# Patient Record
Sex: MALE | Race: WHITE
[De-identification: names, ages, dates, MRNs, and addresses within clinical notes are randomized per-mention and may not be internally consistent; named-entity substitution may affect disease eponyms.]

---

## 2018-12-28 ENCOUNTER — HOSPITAL ENCOUNTER (OUTPATIENT)
Dept: HOSPITAL 84 - D.ER | Age: 38
Discharge: HOME | End: 2018-12-28
Attending: ORTHOPAEDIC SURGERY
Payer: SELF-PAY

## 2018-12-28 VITALS — DIASTOLIC BLOOD PRESSURE: 85 MMHG | SYSTOLIC BLOOD PRESSURE: 135 MMHG

## 2018-12-28 VITALS
WEIGHT: 158.33 LBS | HEIGHT: 64 IN | BODY MASS INDEX: 27.03 KG/M2 | WEIGHT: 158.33 LBS | HEIGHT: 64 IN | WEIGHT: 158.33 LBS | BODY MASS INDEX: 27.03 KG/M2 | HEIGHT: 64 IN | BODY MASS INDEX: 27.03 KG/M2

## 2018-12-28 DIAGNOSIS — Z01.812: ICD-10-CM

## 2018-12-28 DIAGNOSIS — S61.102A: ICD-10-CM

## 2018-12-28 DIAGNOSIS — S62.522B: Primary | ICD-10-CM
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
## 2018-12-28 LAB
ALBUMIN SERPL-MCNC: 4.2 G/DL (ref 3.4–5)
ALP SERPL-CCNC: 103 U/L (ref 46–116)
ALT SERPL-CCNC: 31 U/L (ref 10–68)
ANION GAP SERPL CALC-SCNC: 17.1 MMOL/L (ref 8–16)
BASOPHILS NFR BLD AUTO: 0.2 % (ref 0–2)
BILIRUB SERPL-MCNC: 0.31 MG/DL (ref 0.2–1.3)
BUN SERPL-MCNC: 18 MG/DL (ref 7–18)
CALCIUM SERPL-MCNC: 8.6 MG/DL (ref 8.5–10.1)
CHLORIDE SERPL-SCNC: 101 MMOL/L (ref 98–107)
CO2 SERPL-SCNC: 22.6 MMOL/L (ref 21–32)
CREAT SERPL-MCNC: 1.2 MG/DL (ref 0.6–1.3)
EOSINOPHIL NFR BLD: 1 % (ref 0–7)
ERYTHROCYTE [DISTWIDTH] IN BLOOD BY AUTOMATED COUNT: 13.1 % (ref 11.5–14.5)
GLOBULIN SER-MCNC: 3.9 G/L
GLUCOSE SERPL-MCNC: 79 MG/DL (ref 74–106)
HCT VFR BLD CALC: 46.2 % (ref 42–54)
HGB BLD-MCNC: 16.2 G/DL (ref 13.5–17.5)
IMM GRANULOCYTES NFR BLD: 0.2 % (ref 0–5)
LYMPHOCYTES NFR BLD AUTO: 33.2 % (ref 15–50)
MCH RBC QN AUTO: 32.1 PG (ref 26–34)
MCHC RBC AUTO-ENTMCNC: 35.1 G/DL (ref 31–37)
MCV RBC: 91.5 FL (ref 80–100)
MONOCYTES NFR BLD: 7.9 % (ref 2–11)
NEUTROPHILS NFR BLD AUTO: 57.5 % (ref 40–80)
OSMOLALITY SERPL CALC.SUM OF ELEC: 274 MOSM/KG (ref 275–300)
PLATELET # BLD: 300 10X3/UL (ref 130–400)
PMV BLD AUTO: 9.7 FL (ref 7.4–10.4)
POTASSIUM SERPL-SCNC: 3.7 MMOL/L (ref 3.5–5.1)
PROT SERPL-MCNC: 8.1 G/DL (ref 6.4–8.2)
RBC # BLD AUTO: 5.05 10X6/UL (ref 4.2–6.1)
SODIUM SERPL-SCNC: 137 MMOL/L (ref 136–145)
WBC # BLD AUTO: 12.5 10X3/UL (ref 4.8–10.8)

## 2018-12-28 NOTE — NUR
KWAN BARNETTDELANEY BROUGHT TO PT. NO FAMILY HERE. RELATES HIS EX GIRLFRIEND HAD
TAKEN HIS TRUCK AND PHONE AND HE COULD NOT REMEMBER ANYONES NUMBER.
CALLED HOUSE SUPERVISOR TO GET A VOUCHER FOR A CAB FARE.

## 2018-12-28 NOTE — NUR
TOLERATED DIET. IV DC'D WITH CATHETER INTACT. EXPLAINED TO PT WE WERE
PROVIDING CAB FARE FOR TRANSPORTATION HOME. VERBALIZED UNDERSTANDING.

## 2018-12-28 NOTE — NUR
WRITTEN AND VERBAL DC INST. GIVEN TO PT ALONG WITH RX. VERBALIZED
UNDERSTANDING. PATIENT'S EX GIRLFRIEND CAME INTO THE ROOM AS WE WERE
ABOUT TO CALL FOR A CAB.  SHE WAS HERE TO TAKE HIM HOME.

## 2018-12-30 NOTE — OP
PATIENT NAME:  FARAZ LUEVANO                         MEDICAL RECORD: [id number]
:84                                             LOCATION:D.OPS          
                                                         ADMISSION DATE:        
SURGEON:  LISETTE ROTH MD        
 
 
DATE OF OPERATION:  2018
 
PREOPERATIVE DIAGNOSIS:  Open tuft fracture of the left thumb with nailbed
avulsion.
 
POSTOPERATIVE DIAGNOSIS:  Open tuft fracture of the left thumb with nailbed
avulsion.
 
PROCEDURES:
1.  Nail bed reconstruction.
2.  Debridement of open wound, skin; subcutaneous tissue; portions of fat,
fascia, and muscle; less than 20 cm in aggregate.
 
SURGEON:  Lisette Roth MD
 
ANESTHESIA:  General, TIVA.
 
INTRAOPERATIVE COMPLICATIONS:  None.
 
SUMMARY OF PATHOLOGIC FINDINGS:  The patient had a near complete amputation and
avulsion; however, he had good volar vascularization.  Therefore, this was
irrigated and closed.  The distal tuft of the thumb was broken off.  I did not
feel like it warranted pin fixation as it was very small.
 
OPERATIVE SUMMARY IN DETAIL:  After obtaining the appropriate preoperative
orthopedic surgery consent as well as anesthetic consultation, evaluation, and
clearance, the patient was brought to the operating room and placed on the
operating table in the supine position.  After general laryngeal mask airway was
administered, the patient's left upper extremity, having adequately been
blocked, was prepped and draped in routine sterile fashion.  Copious pulsatile
lavage was also coupled with rongeur and irrigation of debris and small bone
fragments as well as the above mentioned.  At this point, the thumb was held in
a reduced position with the nail bed back into the germinal fold and transnail
bed sutures were taken up into the more proximal aspect of the dorsal aspect of
the skin in multiple rows to reapproximate this.  The side lacerations on radial
and ulnar side of the thumb too were likewise closed with simple sutures. 
Having completed this, sterile dressings were applied.  The patient was awakened
and taken to the recovery room in stable condition.  All final needle and sponge
counts were correct.
 
TRANSINT:[id number] Voice Confirmation ID: [id number] DOCUMENT ID: [id number]
                                           
                                           LISETTE ROTH MD        
 
 
 
Electronically Signed by LISETTE ROTH MD on 18 at 1037
CC:                                                             [id number]
DICTATION DATE: 18 1354     :     18 2201      [facility] 
                                                                      [address]
[place] [address]

## 2019-01-16 ENCOUNTER — HOSPITAL ENCOUNTER (INPATIENT)
Dept: HOSPITAL 84 - D.ER | Age: 39
LOS: 3 days | Discharge: HOME | DRG: 921 | End: 2019-01-19
Attending: ORTHOPAEDIC SURGERY | Admitting: ORTHOPAEDIC SURGERY
Payer: MEDICAID

## 2019-01-16 VITALS
BODY MASS INDEX: 26.8 KG/M2 | WEIGHT: 157 LBS | HEIGHT: 64 IN | BODY MASS INDEX: 26.8 KG/M2 | HEIGHT: 64 IN | WEIGHT: 157 LBS | BODY MASS INDEX: 26.8 KG/M2

## 2019-01-16 DIAGNOSIS — L03.012: ICD-10-CM
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
## 2019-01-16 DIAGNOSIS — S62.522D: ICD-10-CM

## 2019-01-16 DIAGNOSIS — Z72.0: ICD-10-CM
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
## 2019-01-16 DIAGNOSIS — L76.82: Primary | ICD-10-CM

## 2019-01-16 LAB
BASOPHILS NFR BLD AUTO: 0.2 % (ref 0–2)
EOSINOPHIL NFR BLD: 1.4 % (ref 0–7)
ERYTHROCYTE [DISTWIDTH] IN BLOOD BY AUTOMATED COUNT: 13.4 % (ref 11.5–14.5)
HCT VFR BLD CALC: 42.9 % (ref 42–54)
HGB BLD-MCNC: 14.7 G/DL (ref 13.5–17.5)
IMM GRANULOCYTES NFR BLD: 0.2 % (ref 0–5)
LYMPHOCYTES NFR BLD AUTO: 18.6 % (ref 15–50)
MCH RBC QN AUTO: 31.5 PG (ref 26–34)
MCHC RBC AUTO-ENTMCNC: 34.3 G/DL (ref 31–37)
MCV RBC: 92.1 FL (ref 80–100)
MONOCYTES NFR BLD: 9.6 % (ref 2–11)
NEUTROPHILS NFR BLD AUTO: 70 % (ref 40–80)
PLATELET # BLD: 271 10X3/UL (ref 130–400)
PMV BLD AUTO: 8.9 FL (ref 7.4–10.4)
RBC # BLD AUTO: 4.66 10X6/UL (ref 4.2–6.1)
WBC # BLD AUTO: 12.5 10X3/UL (ref 4.8–10.8)

## 2019-01-16 NOTE — NUR
RECEIVED PATIENT FROM ER, ADMITTED WITH CELLULITIS IN L THUMB, GOING FOR SX IN
AM. INTRODUCED SELF TO PATIENT, BED IN LOWEST POSITION, FRIEND AT BEDSIDE WITH
FOOD. EXPLAINED PATIENT IS NPO AFTER MIDNIGHT, PT UNDERSTOOD. CALL LIGHT
WITHIN REACH. NO S/SX OF DISTRESS OF PAIN AT THIS TIME.

## 2019-01-17 VITALS — DIASTOLIC BLOOD PRESSURE: 66 MMHG | SYSTOLIC BLOOD PRESSURE: 115 MMHG

## 2019-01-17 VITALS — SYSTOLIC BLOOD PRESSURE: 110 MMHG | DIASTOLIC BLOOD PRESSURE: 68 MMHG

## 2019-01-17 VITALS — DIASTOLIC BLOOD PRESSURE: 96 MMHG | SYSTOLIC BLOOD PRESSURE: 140 MMHG

## 2019-01-17 VITALS — SYSTOLIC BLOOD PRESSURE: 128 MMHG | DIASTOLIC BLOOD PRESSURE: 70 MMHG

## 2019-01-17 VITALS — SYSTOLIC BLOOD PRESSURE: 109 MMHG | DIASTOLIC BLOOD PRESSURE: 50 MMHG

## 2019-01-17 VITALS — SYSTOLIC BLOOD PRESSURE: 119 MMHG | DIASTOLIC BLOOD PRESSURE: 72 MMHG

## 2019-01-17 NOTE — NUR
INTRODUCED SELF TO PATIENT, PATIENT WAS IN BED EATING A FRUIT TRAY.
PATIENT UPDATED ME THAT HE DIDN'T HAVE SURGERY AND WOULD CONTINUE ON
ANTIBIOTIC TREATMENT. BED IN LOWEST POSITION, CALL LIGHT WITHIN REACH. NO
NEEDS AT THIS TIME.

## 2019-01-17 NOTE — NUR
PATIENT SLEEPING, RESP EVEN AND UNLABORED. BED IN LOWEST POSITION, CALL LIGHT
WITHIN REACH. NO SIGNS/SYMPTOMS OF DISTRESS OR PAIN AT THIS TIME. WILL
CONTINUE TO MONITOR.

## 2019-01-17 NOTE — NUR
HIPACLEANSE TO LEFT THUMB, PATIENT REMAINED NPO THROUGHOUT THE NIGHT. GAVE
BOLUS 0.4MG FOR PAIN CONTROL. RESP LUIS FELIPE AND UNLABORED. PATIENT PROVIDED GOWN
AND INSTRUCTED TO CHANGE INTO IT. BED IN LOWEST POSITION, CALL LIGHT IN REACH.
NO NEEDS AT THIS TIME, WILL CONTINUE TO MONITOR.

## 2019-01-17 NOTE — NUR
PT INQUIRED ON WHEN POSSIBLE SURGERY MAY BE, ADVISED PT I HAVE NOT SEEN ANY
ORDERS YET BUT PT IS NPO AND WE WILL CONTINUE WITH PLAN OF CARE

## 2019-01-18 VITALS — DIASTOLIC BLOOD PRESSURE: 80 MMHG | SYSTOLIC BLOOD PRESSURE: 132 MMHG

## 2019-01-18 VITALS — SYSTOLIC BLOOD PRESSURE: 113 MMHG | DIASTOLIC BLOOD PRESSURE: 70 MMHG

## 2019-01-18 VITALS — SYSTOLIC BLOOD PRESSURE: 118 MMHG | DIASTOLIC BLOOD PRESSURE: 74 MMHG

## 2019-01-18 VITALS — SYSTOLIC BLOOD PRESSURE: 128 MMHG | DIASTOLIC BLOOD PRESSURE: 69 MMHG

## 2019-01-18 VITALS — DIASTOLIC BLOOD PRESSURE: 76 MMHG | SYSTOLIC BLOOD PRESSURE: 116 MMHG

## 2019-01-18 LAB
ANION GAP SERPL CALC-SCNC: 10.2 MMOL/L (ref 8–16)
BUN SERPL-MCNC: 12 MG/DL (ref 7–18)
CALCIUM SERPL-MCNC: 7.8 MG/DL (ref 8.5–10.1)
CHLORIDE SERPL-SCNC: 102 MMOL/L (ref 98–107)
CO2 SERPL-SCNC: 28.6 MMOL/L (ref 21–32)
CREAT SERPL-MCNC: 1 MG/DL (ref 0.6–1.3)
ERYTHROCYTE [DISTWIDTH] IN BLOOD BY AUTOMATED COUNT: 13.6 % (ref 11.5–14.5)
GLUCOSE SERPL-MCNC: 144 MG/DL (ref 74–106)
HCT VFR BLD CALC: 43.5 % (ref 42–54)
HGB BLD-MCNC: 14.2 G/DL (ref 13.5–17.5)
MCH RBC QN AUTO: 30.8 PG (ref 26–34)
MCHC RBC AUTO-ENTMCNC: 32.6 G/DL (ref 31–37)
MCV RBC: 94.4 FL (ref 80–100)
OSMOLALITY SERPL CALC.SUM OF ELEC: 276 MOSM/KG (ref 275–300)
PLATELET # BLD: 262 10X3/UL (ref 130–400)
PMV BLD AUTO: 9.2 FL (ref 7.4–10.4)
POTASSIUM SERPL-SCNC: 3.8 MMOL/L (ref 3.5–5.1)
RBC # BLD AUTO: 4.61 10X6/UL (ref 4.2–6.1)
SODIUM SERPL-SCNC: 137 MMOL/L (ref 136–145)
WBC # BLD AUTO: 9.7 10X3/UL (ref 4.8–10.8)

## 2019-01-18 NOTE — NUR
GAVE PATIENT DILAUDID BOLUS OF 0.4MG, PATIENT GRIMACING AND RATED PAIN 4/10.
BED IN LOWEST POSITION, CALL LIGHT WITHIN REACH. NO NEEDS AT THIS TIME.

## 2019-01-18 NOTE — NUR
PT AAOX4 SITTING UP IN BED EATING LUNCH, NO SIGNS OF DISTRESS NOTED, CL IN
REACH BED IN LOWEST POSTION SR UPX2

## 2019-01-19 VITALS — SYSTOLIC BLOOD PRESSURE: 118 MMHG | DIASTOLIC BLOOD PRESSURE: 73 MMHG

## 2019-01-19 VITALS — DIASTOLIC BLOOD PRESSURE: 81 MMHG | SYSTOLIC BLOOD PRESSURE: 123 MMHG

## 2019-01-19 NOTE — NUR
DISCUSSED DISCHARGE, MEDICATION AND FOLLOW-UP INSTRUCTIONS WITH PATIENT.
VERBALIZED UNDERSTANDING. IV REMOVED, TIP INTACT. ALL BELONGINGS SENT WITH
PATIENT. PATIENT DISCHARGED HOME VIA WHEELCHAIR BY PAULA SWAN.

## 2020-02-27 ENCOUNTER — OFFICE VISIT (OUTPATIENT)
Dept: FAMILY MEDICINE CLINIC | Age: 40
End: 2020-02-27
Payer: MEDICARE

## 2020-02-27 VITALS
SYSTOLIC BLOOD PRESSURE: 120 MMHG | DIASTOLIC BLOOD PRESSURE: 82 MMHG | BODY MASS INDEX: 20.02 KG/M2 | WEIGHT: 156 LBS | OXYGEN SATURATION: 100 % | HEART RATE: 78 BPM | RESPIRATION RATE: 16 BRPM | TEMPERATURE: 97.5 F | HEIGHT: 74 IN

## 2020-02-27 DIAGNOSIS — Z11.4 ENCOUNTER FOR SCREENING FOR HIV: ICD-10-CM

## 2020-02-27 DIAGNOSIS — R20.0 NUMBNESS AND TINGLING IN BOTH HANDS: ICD-10-CM

## 2020-02-27 DIAGNOSIS — E55.9 VITAMIN D DEFICIENCY: ICD-10-CM

## 2020-02-27 DIAGNOSIS — R20.2 NUMBNESS AND TINGLING IN BOTH HANDS: ICD-10-CM

## 2020-02-27 DIAGNOSIS — Z13.220 SCREENING CHOLESTEROL LEVEL: ICD-10-CM

## 2020-02-27 DIAGNOSIS — R19.5 LOOSE STOOLS: ICD-10-CM

## 2020-02-27 PROBLEM — Z00.00 ROUTINE HISTORY AND PHYSICAL EXAMINATION OF ADULT: Status: ACTIVE | Noted: 2020-02-27

## 2020-02-27 LAB
ALBUMIN SERPL-MCNC: 4.4 G/DL (ref 3.5–4.6)
ALP BLD-CCNC: 62 U/L (ref 35–104)
ALT SERPL-CCNC: 23 U/L (ref 0–41)
ANION GAP SERPL CALCULATED.3IONS-SCNC: 18 MEQ/L (ref 9–15)
AST SERPL-CCNC: 35 U/L (ref 0–40)
BILIRUB SERPL-MCNC: 0.4 MG/DL (ref 0.2–0.7)
BUN BLDV-MCNC: 9 MG/DL (ref 6–20)
CALCIUM SERPL-MCNC: 9.8 MG/DL (ref 8.5–9.9)
CHLORIDE BLD-SCNC: 103 MEQ/L (ref 95–107)
CHOLESTEROL, TOTAL: 184 MG/DL (ref 0–199)
CO2: 24 MEQ/L (ref 20–31)
CREAT SERPL-MCNC: 1 MG/DL (ref 0.7–1.2)
FOLATE: 13 NG/ML (ref 7.3–26.1)
GFR AFRICAN AMERICAN: >60
GFR NON-AFRICAN AMERICAN: >60
GLOBULIN: 3.2 G/DL (ref 2.3–3.5)
GLUCOSE BLD-MCNC: 79 MG/DL (ref 70–99)
HCT VFR BLD CALC: 44.5 % (ref 42–52)
HDLC SERPL-MCNC: 57 MG/DL (ref 40–59)
HEMOGLOBIN: 14.8 G/DL (ref 14–18)
LDL CHOLESTEROL CALCULATED: 98 MG/DL (ref 0–129)
MCH RBC QN AUTO: 30.2 PG (ref 27–31.3)
MCHC RBC AUTO-ENTMCNC: 33.4 % (ref 33–37)
MCV RBC AUTO: 90.5 FL (ref 80–100)
PDW BLD-RTO: 13.6 % (ref 11.5–14.5)
PLATELET # BLD: 234 K/UL (ref 130–400)
POTASSIUM SERPL-SCNC: 4.8 MEQ/L (ref 3.4–4.9)
PROCALCITONIN: 0.03 NG/ML (ref 0–0.15)
RBC # BLD: 4.92 M/UL (ref 4.7–6.1)
SODIUM BLD-SCNC: 145 MEQ/L (ref 135–144)
TOTAL PROTEIN: 7.6 G/DL (ref 6.3–8)
TRIGL SERPL-MCNC: 145 MG/DL (ref 0–150)
TSH REFLEX: 1.75 UIU/ML (ref 0.44–3.86)
VITAMIN B-12: 1186 PG/ML (ref 232–1245)
VITAMIN D 25-HYDROXY: 11.7 NG/ML (ref 30–100)
WBC # BLD: 6.5 K/UL (ref 4.8–10.8)

## 2020-02-27 PROCEDURE — 99385 PREV VISIT NEW AGE 18-39: CPT | Performed by: PHYSICIAN ASSISTANT

## 2020-02-27 RX ORDER — PREDNISONE 10 MG/1
TABLET ORAL
Qty: 51 TABLET | Refills: 0 | Status: SHIPPED | OUTPATIENT
Start: 2020-02-27 | End: 2020-03-08

## 2020-02-27 RX ORDER — DICYCLOMINE HYDROCHLORIDE 10 MG/1
10 CAPSULE ORAL 4 TIMES DAILY
Qty: 360 CAPSULE | Refills: 1 | Status: SHIPPED | OUTPATIENT
Start: 2020-02-27 | End: 2020-07-17 | Stop reason: SDUPTHER

## 2020-02-27 RX ORDER — METHOCARBAMOL 500 MG/1
500 TABLET, FILM COATED ORAL NIGHTLY PRN
Qty: 10 TABLET | Refills: 0 | Status: SHIPPED | OUTPATIENT
Start: 2020-02-27 | End: 2020-04-09

## 2020-02-27 ASSESSMENT — PATIENT HEALTH QUESTIONNAIRE - PHQ9
SUM OF ALL RESPONSES TO PHQ QUESTIONS 1-9: 0
1. LITTLE INTEREST OR PLEASURE IN DOING THINGS: 0
SUM OF ALL RESPONSES TO PHQ9 QUESTIONS 1 & 2: 0
SUM OF ALL RESPONSES TO PHQ QUESTIONS 1-9: 0
2. FEELING DOWN, DEPRESSED OR HOPELESS: 0

## 2020-02-27 ASSESSMENT — ENCOUNTER SYMPTOMS
RECTAL PAIN: 0
ABDOMINAL DISTENTION: 1
BACK PAIN: 0
ANAL BLEEDING: 0
BLOOD IN STOOL: 0
FACIAL SWELLING: 0
WHEEZING: 0
CONSTIPATION: 0
COLOR CHANGE: 0
TROUBLE SWALLOWING: 0
DIARRHEA: 1
SHORTNESS OF BREATH: 0
COUGH: 0
VOMITING: 0
CHEST TIGHTNESS: 0
NAUSEA: 0

## 2020-02-27 NOTE — PROGRESS NOTES
Subjective  Ma Portal, 47 y.o. male presents today with:  Chief Complaint   Patient presents with   Delia Bio Establish Care     pain in both arms and back of the neck      HPI  Tiny Kemps is in the office today to establish care. Previous PCP: Dr. Vicente Perez; last OV: 2015    Establish care/routine physical.  Would like to establish care with provider. Has not followed up with a PCP in 5 years. Due for routine labs. Denies any chronic medical conditions. Past history of GERD/reflux. Numbness/tingling hands. Increased bilateral numbness/tingling of hands. Decreased  strength, dropping things. R hand dominant. Sensation of hands cramping and \"zinging\" sensation that travels up arms. Feels like his arms/shoulders will \"spasm\". Has tried a friend's \"arthritis pill\" with little relief. Denies trauma/injury. Loose stools. Chronic condition. Reports that he needs to use the restroom/have a BM every time after he eats. +abdominal cramping/bloating. Relief with BM. Stools are loose/soft. Denies hematochezia, melena, rectal pain. Feels like he has a hard time maintaining his body weight. Denies abnormal weight loss. No fever/night sweats. No known family history of IBD. Review of Systems   Constitutional: Positive for unexpected weight change. Negative for activity change, appetite change, chills, diaphoresis, fatigue and fever. HENT: Negative for congestion, dental problem, ear discharge, ear pain, facial swelling, postnasal drip and trouble swallowing. Eyes: Negative for visual disturbance. Respiratory: Negative for cough, chest tightness, shortness of breath and wheezing. Cardiovascular: Negative for chest pain, palpitations and leg swelling. Gastrointestinal: Positive for abdominal distention (mild cramping/bloating) and diarrhea (loose stools). Negative for anal bleeding, blood in stool, constipation, nausea, rectal pain and vomiting.    Genitourinary: Negative for difficulty urinating. Musculoskeletal: Positive for arthralgias and myalgias. Negative for back pain, gait problem, joint swelling, neck pain and neck stiffness. Skin: Negative for color change and rash. Neurological: Positive for numbness. Negative for dizziness and weakness. Hematological: Does not bruise/bleed easily. Psychiatric/Behavioral: Negative for agitation, dysphoric mood and sleep disturbance. The patient is not nervous/anxious. History reviewed. No pertinent past medical history. Past Surgical History:   Procedure Laterality Date    COLONOSCOPY  3/27/15    w/bx      Social History     Socioeconomic History    Marital status: Single     Spouse name: Not on file    Number of children: Not on file    Years of education: Not on file    Highest education level: Not on file   Occupational History    Not on file   Social Needs    Financial resource strain: Not on file    Food insecurity:     Worry: Not on file     Inability: Not on file    Transportation needs:     Medical: Not on file     Non-medical: Not on file   Tobacco Use    Smoking status: Former Smoker     Packs/day: 0.25     Years: 4.00     Pack years: 1.00     Last attempt to quit: 2019     Years since quittin.0    Smokeless tobacco: Never Used   Substance and Sexual Activity    Alcohol use:  Yes     Alcohol/week: 14.0 standard drinks     Types: 14 Cans of beer per week    Drug use: No    Sexual activity: Yes     Partners: Female   Lifestyle    Physical activity:     Days per week: Not on file     Minutes per session: Not on file    Stress: Not on file   Relationships    Social connections:     Talks on phone: Not on file     Gets together: Not on file     Attends Jehov[employer]'s witness service: Not on file     Active member of club or organization: Not on file     Attends meetings of clubs or organizations: Not on file     Relationship status: Not on file    Intimate partner violence:     Fear of current or ex partner: Not on file     Emotionally abused: Not on file     Physically abused: Not on file     Forced sexual activity: Not on file   Other Topics Concern    Not on file   Social History Narrative    Not on file     Family History   Problem Relation Age of Onset    High Blood Pressure Mother     High Blood Pressure Father      Allergies   Allergen Reactions    Vicodin [Hydrocodone-Acetaminophen] Other (See Comments)     Stomach cramps     Current Outpatient Medications   Medication Sig Dispense Refill    dicyclomine (BENTYL) 10 MG capsule Take 1 capsule by mouth 4 times daily 360 capsule 1    predniSONE (DELTASONE) 10 MG tablet Take 6 tabs x 6 days, then 5 x 1, 4 x 1, 3 x 1, 2 x 1, 1 x 1 51 tablet 0    methocarbamol (ROBAXIN) 500 MG tablet Take 1 tablet by mouth nightly as needed (upper back spasm) 10 tablet 0     No current facility-administered medications for this visit. PMH, Surgical Hx, Family Hx, and Social Hx reviewed and updated. Health Maintenance reviewed. Objective  Vitals:    02/27/20 1051   BP: 120/82   Site: Left Upper Arm   Position: Sitting   Cuff Size: Medium Adult   Pulse: 78   Resp: 16   Temp: 97.5 °F (36.4 °C)   TempSrc: Temporal   SpO2: 100%   Weight: 156 lb (70.8 kg)   Height: 6' 2\" (1.88 m)     BP Readings from Last 3 Encounters:   02/27/20 120/82   03/24/15 127/86   03/11/15 124/72     Wt Readings from Last 3 Encounters:   02/27/20 156 lb (70.8 kg)   03/24/15 162 lb (73.5 kg)   03/11/15 160 lb (72.6 kg)     Physical Exam  Vitals signs reviewed. Constitutional:       General: He is not in acute distress. Appearance: He is well-developed. He is not ill-appearing, toxic-appearing or diaphoretic. Comments: Thin male, sitting in exam room. NAD   HENT:      Head: Normocephalic and atraumatic. Eyes:      Conjunctiva/sclera: Conjunctivae normal.   Neck:      Musculoskeletal: Normal range of motion. Cardiovascular:      Rate and Rhythm: Normal rate.    Pulmonary: Effort: Pulmonary effort is normal.   Abdominal:      General: Abdomen is flat. Bowel sounds are increased. Palpations: Abdomen is soft. Tenderness: There is no abdominal tenderness. Musculoskeletal:      Cervical back: He exhibits tenderness and spasm (bilateral upper shoulders/trapezius). He exhibits normal range of motion and no bony tenderness. Comments:  strength 4/5 bilaterally. Skin:     General: Skin is warm and dry. Neurological:      Mental Status: He is alert and oriented to person, place, and time. Cranial Nerves: No cranial nerve deficit. Assessment & Plan   Yuki Machado was seen today for establish care. Diagnoses and all orders for this visit:    Routine history and physical examination of adult    Encounter for screening for HIV  -     Cancel: HIV-1,-2 w/Reflex to HIV-1 Western Blot; Future    BMI 20.0-20.9, adult  -     CBC; Future  -     Comprehensive Metabolic Panel; Future  -     TSH with Reflex; Future    Loose stools  -     dicyclomine (BENTYL) 10 MG capsule; Take 1 capsule by mouth 4 times daily  -     TSH with Reflex; Future  -     Procalcitonin; Future    Screening cholesterol level  -     Lipid Panel; Future    Numbness and tingling in both hands  -     Vitamin B12 & Folate; Future  -     EMG; Future    Vitamin D deficiency  -     Vitamin D 25 Hydroxy; Future    Muscle spasm of back  -     predniSONE (DELTASONE) 10 MG tablet; Take 6 tabs x 6 days, then 5 x 1, 4 x 1, 3 x 1, 2 x 1, 1 x 1  -     methocarbamol (ROBAXIN) 500 MG tablet; Take 1 tablet by mouth nightly as needed (upper back spasm)    4 week follow up with me.     Orders Placed This Encounter   Procedures    CBC     Standing Status:   Future     Number of Occurrences:   1     Standing Expiration Date:   2/26/2021    Comprehensive Metabolic Panel     Standing Status:   Future     Number of Occurrences:   1     Standing Expiration Date:   2/26/2021    Lipid Panel     Standing Status: for coordination of care. I have reviewed the patient's medical history in detail and updated the computerized patient record.     Lucinda Kemp PA-C

## 2020-02-28 LAB — HIV 1,2 COMBO ANTIGEN/ANTIBODY: NEGATIVE

## 2020-02-28 RX ORDER — ERGOCALCIFEROL 1.25 MG/1
CAPSULE ORAL
Qty: 17 CAPSULE | Refills: 1 | Status: SHIPPED | OUTPATIENT
Start: 2020-02-28 | End: 2020-05-28

## 2020-03-19 ENCOUNTER — TELEPHONE (OUTPATIENT)
Dept: FAMILY MEDICINE CLINIC | Age: 40
End: 2020-03-19

## 2020-03-19 RX ORDER — CELECOXIB 100 MG/1
100 CAPSULE ORAL 2 TIMES DAILY
Qty: 60 CAPSULE | Refills: 3 | Status: SHIPPED | OUTPATIENT
Start: 2020-03-19 | End: 2020-03-20

## 2020-03-19 NOTE — TELEPHONE ENCOUNTER
Sent medication. Take as needed. No more than twice daily. Do not use with motrin/aleve. This will be safe for his stomach.

## 2020-03-19 NOTE — TELEPHONE ENCOUNTER
Patient calling to let you know that his right arm is still bothering him, in pain, unable to sleep well. What can he do for the pain? Can you prescribe something for pain? Please advise and thanks!

## 2020-03-20 ENCOUNTER — TELEPHONE (OUTPATIENT)
Dept: FAMILY MEDICINE CLINIC | Age: 40
End: 2020-03-20

## 2020-03-20 RX ORDER — MELOXICAM 7.5 MG/1
7.5 TABLET ORAL DAILY
Qty: 30 TABLET | Refills: 3 | Status: SHIPPED | OUTPATIENT
Start: 2020-03-20 | End: 2020-09-03 | Stop reason: SDUPTHER

## 2020-03-20 NOTE — TELEPHONE ENCOUNTER
Patient called the celebrex is 200$ so he is requesting something else sent to the pharmacy for the pain

## 2020-03-28 PROBLEM — Z00.00 ROUTINE HISTORY AND PHYSICAL EXAMINATION OF ADULT: Status: RESOLVED | Noted: 2020-02-27 | Resolved: 2020-03-28

## 2020-05-28 RX ORDER — ERGOCALCIFEROL 1.25 MG/1
CAPSULE ORAL
Qty: 4 CAPSULE | Refills: 5 | Status: SHIPPED | OUTPATIENT
Start: 2020-05-28 | End: 2020-07-17 | Stop reason: SDUPTHER

## 2020-07-17 RX ORDER — DICYCLOMINE HYDROCHLORIDE 10 MG/1
10 CAPSULE ORAL 4 TIMES DAILY
Qty: 360 CAPSULE | Refills: 1 | Status: SHIPPED | OUTPATIENT
Start: 2020-07-17 | End: 2022-02-04 | Stop reason: DRUGHIGH

## 2020-07-17 RX ORDER — ERGOCALCIFEROL 1.25 MG/1
CAPSULE ORAL
Qty: 4 CAPSULE | Refills: 5 | Status: SHIPPED | OUTPATIENT
Start: 2020-07-17 | End: 2020-09-03 | Stop reason: SDUPTHER

## 2020-07-17 NOTE — TELEPHONE ENCOUNTER
Patient requesting medication refill. Please approve or deny this request.    Rx requested:  Requested Prescriptions     Pending Prescriptions Disp Refills    vitamin D (ERGOCALCIFEROL) 1.25 MG (20512 UT) CAPS capsule 4 capsule 5     Si capsule by mouth weekly.  dicyclomine (BENTYL) 10 MG capsule 360 capsule 1     Sig: Take 1 capsule by mouth 4 times daily         Last Office Visit:   2020      Next Visit Date:  No future appointments.

## 2021-10-03 NOTE — TELEPHONE ENCOUNTER
Rx request   Requested Prescriptions     Pending Prescriptions Disp Refills    meloxicam (MOBIC) 7.5 MG tablet [Pharmacy Med Name: MELOXICAM 7.5 MG TABLET] 30 tablet 3     Sig: TAKE 1 TABLET BY MOUTH EVERY DAY     LOV 2/27/2020  Next Visit Date:  No future appointments.

## 2021-10-04 RX ORDER — MELOXICAM 7.5 MG/1
TABLET ORAL
Qty: 30 TABLET | Refills: 3 | Status: SHIPPED | OUTPATIENT
Start: 2021-10-04 | End: 2022-03-08

## 2021-11-29 ENCOUNTER — TELEPHONE (OUTPATIENT)
Dept: FAMILY MEDICINE CLINIC | Age: 41
End: 2021-11-29

## 2021-12-02 NOTE — TELEPHONE ENCOUNTER
Spoke with Rusty Etienne at the pharmacy. He states that this medication has no known notes of being discontinued. Only notes in computer is 11/7/2020 they refilled med. Then on 12/18/2020 patient called pharmacy to inactivate the medication.

## 2022-02-04 ENCOUNTER — VIRTUAL VISIT (OUTPATIENT)
Dept: FAMILY MEDICINE CLINIC | Age: 42
End: 2022-02-04
Payer: MEDICARE

## 2022-02-04 DIAGNOSIS — R20.2 NUMBNESS AND TINGLING IN BOTH HANDS: Primary | ICD-10-CM

## 2022-02-04 DIAGNOSIS — E55.9 VITAMIN D DEFICIENCY: ICD-10-CM

## 2022-02-04 DIAGNOSIS — R20.0 NUMBNESS AND TINGLING IN BOTH HANDS: Primary | ICD-10-CM

## 2022-02-04 DIAGNOSIS — Z00.00 ANNUAL PHYSICAL EXAM: ICD-10-CM

## 2022-02-04 DIAGNOSIS — R19.5 LOOSE STOOLS: ICD-10-CM

## 2022-02-04 DIAGNOSIS — K21.9 GASTROESOPHAGEAL REFLUX DISEASE WITHOUT ESOPHAGITIS: ICD-10-CM

## 2022-02-04 PROCEDURE — G8427 DOCREV CUR MEDS BY ELIG CLIN: HCPCS | Performed by: PHYSICIAN ASSISTANT

## 2022-02-04 PROCEDURE — 99214 OFFICE O/P EST MOD 30 MIN: CPT | Performed by: PHYSICIAN ASSISTANT

## 2022-02-04 RX ORDER — DICYCLOMINE HCL 20 MG
20 TABLET ORAL 4 TIMES DAILY
Qty: 120 TABLET | Refills: 2 | Status: SHIPPED | OUTPATIENT
Start: 2022-02-04 | End: 2022-05-04

## 2022-02-04 RX ORDER — ERGOCALCIFEROL 1.25 MG/1
CAPSULE ORAL
Qty: 4 CAPSULE | Refills: 5 | Status: SHIPPED | OUTPATIENT
Start: 2022-02-04 | End: 2022-03-23

## 2022-02-04 RX ORDER — OMEPRAZOLE 40 MG/1
40 CAPSULE, DELAYED RELEASE ORAL DAILY
Qty: 30 CAPSULE | Refills: 3 | Status: SHIPPED | OUTPATIENT
Start: 2022-02-04 | End: 2022-06-06

## 2022-02-04 SDOH — ECONOMIC STABILITY: FOOD INSECURITY: WITHIN THE PAST 12 MONTHS, YOU WORRIED THAT YOUR FOOD WOULD RUN OUT BEFORE YOU GOT MONEY TO BUY MORE.: NEVER TRUE

## 2022-02-04 SDOH — ECONOMIC STABILITY: FOOD INSECURITY: WITHIN THE PAST 12 MONTHS, THE FOOD YOU BOUGHT JUST DIDN'T LAST AND YOU DIDN'T HAVE MONEY TO GET MORE.: NEVER TRUE

## 2022-02-04 ASSESSMENT — ENCOUNTER SYMPTOMS
TROUBLE SWALLOWING: 0
SHORTNESS OF BREATH: 0
COLOR CHANGE: 0
ANAL BLEEDING: 0
RECTAL PAIN: 0
BLOOD IN STOOL: 0
DIARRHEA: 1
VOMITING: 0
NAUSEA: 0
FACIAL SWELLING: 0
COUGH: 0
ABDOMINAL DISTENTION: 1
CHEST TIGHTNESS: 0
BACK PAIN: 0
CONSTIPATION: 0
WHEEZING: 0

## 2022-02-04 ASSESSMENT — PATIENT HEALTH QUESTIONNAIRE - PHQ9
SUM OF ALL RESPONSES TO PHQ QUESTIONS 1-9: 0
SUM OF ALL RESPONSES TO PHQ QUESTIONS 1-9: 0
2. FEELING DOWN, DEPRESSED OR HOPELESS: 0
1. LITTLE INTEREST OR PLEASURE IN DOING THINGS: 0
SUM OF ALL RESPONSES TO PHQ9 QUESTIONS 1 & 2: 0
SUM OF ALL RESPONSES TO PHQ QUESTIONS 1-9: 0
SUM OF ALL RESPONSES TO PHQ QUESTIONS 1-9: 0

## 2022-02-04 ASSESSMENT — SOCIAL DETERMINANTS OF HEALTH (SDOH): HOW HARD IS IT FOR YOU TO PAY FOR THE VERY BASICS LIKE FOOD, HOUSING, MEDICAL CARE, AND HEATING?: NOT HARD AT ALL

## 2022-02-04 NOTE — PROGRESS NOTES
2022    TELEHEALTH EVALUATION -- Audio/Visual (During UKYEO-20 public health emergency)    Due to COVID 19 outbreak, patient's office visit was converted to a virtual visit. Patient was contacted and agreed to proceed with a virtual visit via Telephone Visit--total length of call 15-20 minutes. The risks and benefits of converting to a virtual visit were discussed in light of the current infectious disease epidemic. Patient also understood that insurance coverage and co-pays are up to their individual insurance plans. HPI:    Elza Flor (:  1980) has requested an audio/video evaluation for the following concern(s):    VV today for follow up. Last OV with me: 2020   Overall, feeling well. No concerns. Due for labs and for refills today. Numbness/tingling hands. Increased bilateral numbness/tingling of hands. Decreased  strength, dropping things. R hand dominant. Sensation of hands cramping and \"zinging\" sensation that travels up arms. Feels like his arms/shoulders will \"spasm\". Takes 7.5 mg mobic daily with some relief. Did not have EMG completed.      Loose stools. Chronic condition. Reports that he needs to use the restroom/have a BM every time after he eats. +abdominal cramping/bloating. Relief with BM. Stools are loose/soft. Denies hematochezia, melena, rectal pain. Feels like he has a hard time maintaining his body weight. Denies abnormal weight loss. No fever/night sweats. No known family history of IBD. Last colonoscopy was in --normal mucosa noted. Would like to follow up. Would like omeprazole refilled today. Review of Systems   Constitutional: Negative for activity change, appetite change, chills, diaphoresis, fatigue, fever and unexpected weight change. HENT: Negative for congestion, dental problem, ear discharge, ear pain, facial swelling, postnasal drip and trouble swallowing. Eyes: Negative for visual disturbance. Respiratory: Negative for cough, chest tightness, shortness of breath and wheezing. Cardiovascular: Negative for chest pain, palpitations and leg swelling. Gastrointestinal: Positive for abdominal distention (mild cramping/bloating) and diarrhea (loose stools). Negative for anal bleeding, blood in stool, constipation, nausea, rectal pain and vomiting. Genitourinary: Negative for difficulty urinating. Musculoskeletal: Positive for arthralgias and myalgias. Negative for back pain, gait problem, joint swelling, neck pain and neck stiffness. Skin: Negative for color change and rash. Neurological: Positive for numbness. Negative for dizziness and weakness. Hematological: Does not bruise/bleed easily. Psychiatric/Behavioral: Negative for agitation, dysphoric mood and sleep disturbance. The patient is not nervous/anxious. Prior to Visit Medications    Medication Sig Taking? Authorizing Provider   omeprazole (PRILOSEC) 40 MG delayed release capsule Take 1 capsule by mouth daily Yes Lucinda Kemp PA-C   vitamin D (ERGOCALCIFEROL) 1.25 MG (13358 UT) CAPS capsule TAKE 1 CAPSULE BY MOUTH ONE TIME PER WEEK Yes Lucinda Kemp PA-C   dicyclomine (BENTYL) 20 MG tablet Take 1 tablet by mouth 4 times daily Yes Lucinda Kemp PA-C   meloxicam (MOBIC) 7.5 MG tablet TAKE 1 TABLET BY MOUTH EVERY DAY Yes Katia Stevens MD       Social History     Tobacco Use    Smoking status: Former Smoker     Packs/day: 0.25     Years: 4.00     Pack years: 1.00     Quit date: 2019     Years since quittin.9    Smokeless tobacco: Never Used   Substance Use Topics    Alcohol use: Yes     Alcohol/week: 14.0 standard drinks     Types: 14 Cans of beer per week    Drug use: No        Allergies   Allergen Reactions    Vicodin [Hydrocodone-Acetaminophen] Other (See Comments)     Stomach cramps   , History reviewed. No pertinent past medical history. ,   Past Surgical History:   Procedure Laterality Date    COLONOSCOPY 3/27/15    w/bx    ,   Social History     Tobacco Use    Smoking status: Former Smoker     Packs/day: 0.25     Years: 4.00     Pack years: 1.00     Quit date: 2019     Years since quittin.9    Smokeless tobacco: Never Used   Substance Use Topics    Alcohol use:  Yes     Alcohol/week: 14.0 standard drinks     Types: 14 Cans of beer per week    Drug use: No   ,   Family History   Problem Relation Age of Onset    High Blood Pressure Mother     High Blood Pressure Father    ,   Immunization History   Administered Date(s) Administered    Tdap (Boostrix, Adacel) 2015   ,   Health Maintenance   Topic Date Due    Hepatitis C screen  Never done    Varicella vaccine (1 of 2 - 2-dose childhood series) Never done    COVID-19 Vaccine (1) Never done    Depression Screen  Never done    Flu vaccine (1) Never done    DTaP/Tdap/Td vaccine (2 - Td or Tdap) 2025    Lipid screen  2025    Colon cancer screen colonoscopy  2025    HIV screen  Completed    Hepatitis A vaccine  Aged Out    Hepatitis B vaccine  Aged Out    Hib vaccine  Aged Out    Meningococcal (ACWY) vaccine  Aged Out    Pneumococcal 0-64 years Vaccine  Aged Out       PHYSICAL EXAMINATION:  [ INSTRUCTIONS:  \"[x]\" Indicates a positive item  \"[]\" Indicates a negative item  -- DELETE ALL ITEMS NOT EXAMINED]  [x] Alert  [x] Oriented to person/place/time    [x] No apparent distress  [] Toxic appearing    [] Face flushed appearing [] Sclera clear  [] Lips are cyanotic      [x] Breathing appears normal  [] Appears tachypneic      [] Rash on visible skin    [] Cranial Nerves II-XII grossly intact    [] Motor grossly intact in visible upper extremities    [] Motor grossly intact in visible lower extremities    [x] Normal Mood  [] Anxious appearing    [] Depressed appearing  [] Confused appearing      [] Poor short term memory  [] Poor long term memory    [] OTHER:      Due to this being a TeleHealth encounter, evaluation of the following organ systems is limited: Vitals/Constitutional/EENT/Resp/CV/GI//MS/Neuro/Skin/Heme-Lymph-Imm. ASSESSMENT/PLAN:  1. Numbness and tingling in both hands    - EMG; Future    2. Vitamin D deficiency    - vitamin D (ERGOCALCIFEROL) 1.25 MG (33622 UT) CAPS capsule; TAKE 1 CAPSULE BY MOUTH ONE TIME PER WEEK  Dispense: 4 capsule; Refill: 5  - Vitamin D 25 Hydroxy; Future    3. Loose stools    - dicyclomine (BENTYL) 20 MG tablet; Take 1 tablet by mouth 4 times daily  Dispense: 120 tablet; Refill: 2  - Ambulatory referral to Gastroenterology    4. Annual physical exam    - CBC Auto Differential; Future  - Comprehensive Metabolic Panel; Future  - Lipid Panel; Future    5. Gastroesophageal reflux disease without esophagitis    - omeprazole (PRILOSEC) 40 MG delayed release capsule; Take 1 capsule by mouth daily  Dispense: 30 capsule; Refill: 3  - Ambulatory referral to Gastroenterology      4-6 week follow up with me once labs/testing completed. An  electronic signature was used to authenticate this note. --Vero Leggett PA-C on 2/4/2022 at 3:22 PM        Pursuant to the emergency declaration under the [facility] waiver authority and the [employer] and [employer] Act, this Virtual  Visit was conducted, with patient's consent, to reduce the patient's risk of exposure to COVID-19 and provide continuity of care for an established patient. Services were provided through a video synchronous discussion virtually to substitute for in-person clinic visit.

## 2022-03-08 RX ORDER — MELOXICAM 7.5 MG/1
TABLET ORAL
Qty: 30 TABLET | Refills: 3 | Status: SHIPPED | OUTPATIENT
Start: 2022-03-08 | End: 2022-07-05

## 2022-03-08 NOTE — TELEPHONE ENCOUNTER
pharmacy requesting medication refill.  Please approve or deny this request.    Rx requested:  Requested Prescriptions     Pending Prescriptions Disp Refills    meloxicam (MOBIC) 7.5 MG tablet [Pharmacy Med Name: MELOXICAM 7.5 MG TABLET] 30 tablet 3     Sig: TAKE 1 TABLET BY MOUTH EVERY DAY         Last Office Visit:   Visit date not found      Next Visit Date:  Future Appointments   Date Time Provider Cookie Colby   3/15/2022 10:15 AM Subhash Buchanan MD [address]

## 2022-03-15 ENCOUNTER — HOSPITAL ENCOUNTER (OUTPATIENT)
Dept: NEUROLOGY | Age: 42
Discharge: HOME | End: 2022-03-15
Payer: MEDICARE

## 2022-03-15 DIAGNOSIS — R20.0 NUMBNESS AND TINGLING IN BOTH HANDS: ICD-10-CM

## 2022-03-15 DIAGNOSIS — R20.2 NUMBNESS AND TINGLING IN BOTH HANDS: ICD-10-CM

## 2022-03-15 PROCEDURE — 95911 NRV CNDJ TEST 9-10 STUDIES: CPT

## 2022-03-15 PROCEDURE — 95886 MUSC TEST DONE W/N TEST COMP: CPT

## 2022-03-15 NOTE — PROCEDURES
[address]                      [address], [address] [facility]                             ELECTROMYOGRAM REPORT    PATIENT NAME: Jimy Rivera                  :        1980  MED REC NO:   [id number]                            ROOM:  ACCOUNT NO:   [de-identified]                           ADMIT DATE: 03/15/2022  PROVIDER:     Brenda Haines MD    DATE OF EM/15/2022    REASON FOR STUDY:  Neurophysiological studies are requested for the  patient's locking of the right arm distally. FINDINGS:  MOTOR NERVE CONDUCTION STUDIES:  Motor nerve conduction study of the  right median nerve shows normal amplitudes, latency, and conduction  velocity. Motor nerve conduction study of the left median nerve shows  normal amplitudes, latency, and conduction velocity. Motor nerve  conduction study of the right ulnar nerve shows normal amplitudes,  latency, and conduction velocity. Motor nerve conduction study of the  left ulnar nerve shows normal amplitudes, latency, and conduction  velocity. F-responses are normal.    SENSORY NERVE CONDUCTION STUDIES:  Sensory nerve conduction study of the  right median nerve recorded in digit 2 shows minimally prolonged  latencies, normal amplitudes, and decreased conduction velocity. _____  shows prolonged latencies, low amplitudes, and decreased conduction  velocity. The left median digit 2 examination shows normal amplitudes,  latency, and conduction velocity. The left median mid palm stimulation  shows minimally prolonged latencies, low amplitudes, and decreased  conduction velocity. The right ulnar digit 2 examination shows normal  amplitudes, latency, and conduction velocity. The left ulnar digit 2  examination shows normal amplitudes, latency, and conduction velocity. The right ulnar mixed orthodromic study shows normal peak latency, low  amplitudes, and decreased conduction velocity.   The left ulnar mixed  orthodromic study shows normal peak latency, low amplitudes, and  decreased conduction velocity. The radial sensory nerve conduction  studies are normal.    Needle electrode examination of the above-sampled muscles shows  denervation changes seen in the right brachioradialis muscle, extensor  digitorum communis muscle, and biceps muscle. Neurogenic units are  noted. No active denervation is notable. IMPRESSION:  Mild early right median nerve neuropathy at the wrist  suggesting carpal tunnel syndrome. The patient's symptoms, though, appear to be more extensive in the C5,  C6, and C7 distribution with some chronic denervation changes. This  either is a cervical radiculopathy or a residual brachial plexopathy as  seen in [place] syndrome. An MRI of the cervical spine first  must be done to rule out a cervical pathology. Multiple sensory nerve conduction studies are evaluated to avoid any  artifact of temperature differences. This test is personally performed and interpreted by me. Thank you Belkys Horowitz for asking us to assist in the care of this  patient.     With kindest regards,        Kary Gipson MD    D: 03/15/2022 10:49:32       T: 03/15/2022 10:54:19     [email or web address]  Job#: [id number]     Doc#: [id number]    CC:

## 2022-03-20 PROCEDURE — 95886 MUSC TEST DONE W/N TEST COMP: CPT | Performed by: PSYCHIATRY & NEUROLOGY

## 2022-03-20 PROCEDURE — 95913 NRV CNDJ TEST 13/> STUDIES: CPT | Performed by: PSYCHIATRY & NEUROLOGY

## 2022-03-23 DIAGNOSIS — E55.9 VITAMIN D DEFICIENCY: ICD-10-CM

## 2022-03-23 RX ORDER — ERGOCALCIFEROL 1.25 MG/1
CAPSULE ORAL
Qty: 4 CAPSULE | Refills: 5 | Status: SHIPPED | OUTPATIENT
Start: 2022-03-23

## 2022-03-23 NOTE — TELEPHONE ENCOUNTER
Rx request   Requested Prescriptions     Pending Prescriptions Disp Refills    vitamin D (ERGOCALCIFEROL) 1.25 MG (37025 UT) CAPS capsule [Pharmacy Med Name: VITAMIN D2 1.25MG(50,000 UNIT)] 4 capsule 5     Sig: TAKE 1 CAPSULE BY MOUTH ONE TIME PER WEEK     LOV 2/4/2022  Next Visit Date:  Future Appointments   Date Time Provider Cookie Colby   3/28/2022  3:00 PM Olivia Arguello MD [address]   4/14/2022  9:15 AM Petra Russo [facility]

## 2022-03-24 DIAGNOSIS — R20.0 NUMBNESS AND TINGLING IN BOTH HANDS: ICD-10-CM

## 2022-03-24 DIAGNOSIS — R94.131 ABNORMAL EMG: Primary | ICD-10-CM

## 2022-03-24 DIAGNOSIS — R20.2 NUMBNESS AND TINGLING IN BOTH HANDS: ICD-10-CM

## 2022-05-01 DIAGNOSIS — R19.5 LOOSE STOOLS: ICD-10-CM

## 2022-05-04 RX ORDER — DICYCLOMINE HCL 20 MG
TABLET ORAL
Qty: 120 TABLET | Refills: 2 | Status: SHIPPED | OUTPATIENT
Start: 2022-05-04

## 2022-06-06 DIAGNOSIS — K21.9 GASTROESOPHAGEAL REFLUX DISEASE WITHOUT ESOPHAGITIS: ICD-10-CM

## 2022-06-06 RX ORDER — OMEPRAZOLE 40 MG/1
CAPSULE, DELAYED RELEASE ORAL
Qty: 30 CAPSULE | Refills: 3 | Status: SHIPPED | OUTPATIENT
Start: 2022-06-06 | End: 2022-10-04

## 2022-06-06 NOTE — TELEPHONE ENCOUNTER
pharmacy requesting medication refill. Please approve or deny this request.    Rx requested:  Requested Prescriptions     Pending Prescriptions Disp Refills    omeprazole (PRILOSEC) 40 MG delayed release capsule [Pharmacy Med Name: OMEPRAZOLE DR 40 MG CAPSULE] 30 capsule 3     Sig: TAKE 1 CAPSULE BY MOUTH EVERY DAY         Last Office Visit:   2/4/2022      Next Visit Date:  No future appointments.

## 2022-07-05 RX ORDER — MELOXICAM 7.5 MG/1
TABLET ORAL
Qty: 30 TABLET | Refills: 3 | Status: SHIPPED | OUTPATIENT
Start: 2022-07-05

## 2022-07-05 NOTE — TELEPHONE ENCOUNTER
Rx request   Requested Prescriptions     Pending Prescriptions Disp Refills    meloxicam (MOBIC) 7.5 MG tablet [Pharmacy Med Name: MELOXICAM 7.5 MG TABLET] 30 tablet 3     Sig: TAKE 1 TABLET BY MOUTH EVERY DAY     LOV 2/4/2022  Next Visit Date:  No future appointments.

## 2022-10-04 DIAGNOSIS — K21.9 GASTROESOPHAGEAL REFLUX DISEASE WITHOUT ESOPHAGITIS: ICD-10-CM

## 2022-10-04 RX ORDER — OMEPRAZOLE 40 MG/1
CAPSULE, DELAYED RELEASE ORAL
Qty: 30 CAPSULE | Refills: 3 | Status: SHIPPED | OUTPATIENT
Start: 2022-10-04

## 2022-10-04 NOTE — TELEPHONE ENCOUNTER
pharmacy requesting medication refill. Please approve or deny this request.    Rx requested:  Requested Prescriptions     Pending Prescriptions Disp Refills    omeprazole (PRILOSEC) 40 MG delayed release capsule [Pharmacy Med Name: OMEPRAZOLE DR 40 MG CAPSULE] 30 capsule 3     Sig: TAKE 1 CAPSULE BY MOUTH EVERY DAY         Last Office Visit:   2/4/2022      Next Visit Date:  No future appointments.

## 2022-11-16 RX ORDER — MELOXICAM 7.5 MG/1
TABLET ORAL
Qty: 30 TABLET | Refills: 3 | Status: SHIPPED | OUTPATIENT
Start: 2022-11-16

## 2023-06-08 ENCOUNTER — OFFICE VISIT (OUTPATIENT)
Dept: FAMILY MEDICINE CLINIC | Age: 43
End: 2023-06-08
Payer: MEDICAID

## 2023-06-08 VITALS
RESPIRATION RATE: 18 BRPM | WEIGHT: 163 LBS | BODY MASS INDEX: 20.92 KG/M2 | SYSTOLIC BLOOD PRESSURE: 108 MMHG | DIASTOLIC BLOOD PRESSURE: 70 MMHG | HEIGHT: 74 IN | HEART RATE: 72 BPM | OXYGEN SATURATION: 96 %

## 2023-06-08 DIAGNOSIS — Z86.69 HISTORY OF HYDROCEPHALUS AS A CHILD: ICD-10-CM

## 2023-06-08 DIAGNOSIS — R51.9 RECURRENT HEADACHE: ICD-10-CM

## 2023-06-08 DIAGNOSIS — E55.9 VITAMIN D DEFICIENCY: ICD-10-CM

## 2023-06-08 DIAGNOSIS — R19.5 LOOSE STOOLS: ICD-10-CM

## 2023-06-08 DIAGNOSIS — K21.9 GASTROESOPHAGEAL REFLUX DISEASE WITHOUT ESOPHAGITIS: Primary | ICD-10-CM

## 2023-06-08 DIAGNOSIS — R94.131 ABNORMAL EMG: ICD-10-CM

## 2023-06-08 LAB
ALBUMIN SERPL-MCNC: 4.3 G/DL (ref 3.5–4.6)
ALP SERPL-CCNC: 67 U/L (ref 35–104)
ALT SERPL-CCNC: 25 U/L (ref 0–41)
ANION GAP SERPL CALCULATED.3IONS-SCNC: 12 MEQ/L (ref 9–15)
AST SERPL-CCNC: 28 U/L (ref 0–40)
BILIRUB SERPL-MCNC: <0.2 MG/DL (ref 0.2–0.7)
BUN SERPL-MCNC: 15 MG/DL (ref 6–20)
CALCIUM SERPL-MCNC: 9.2 MG/DL (ref 8.5–9.9)
CHLORIDE SERPL-SCNC: 104 MEQ/L (ref 95–107)
CHOLEST SERPL-MCNC: 191 MG/DL (ref 0–199)
CO2 SERPL-SCNC: 24 MEQ/L (ref 20–31)
CREAT SERPL-MCNC: 1.08 MG/DL (ref 0.7–1.2)
ERYTHROCYTE [DISTWIDTH] IN BLOOD BY AUTOMATED COUNT: 14.1 % (ref 11.5–14.5)
GLOBULIN SER CALC-MCNC: 2.6 G/DL (ref 2.3–3.5)
GLUCOSE SERPL-MCNC: 75 MG/DL (ref 70–99)
HCT VFR BLD AUTO: 43.5 % (ref 42–52)
HDLC SERPL-MCNC: 47 MG/DL (ref 40–59)
HGB BLD-MCNC: 14.2 G/DL (ref 14–18)
LDL CHOLESTEROL CALCULATED: 86 MG/DL (ref 0–129)
MCH RBC QN AUTO: 28.7 PG (ref 27–31.3)
MCHC RBC AUTO-ENTMCNC: 32.7 % (ref 33–37)
MCV RBC AUTO: 87.5 FL (ref 79–92.2)
PLATELET # BLD AUTO: 185 K/UL (ref 130–400)
POTASSIUM SERPL-SCNC: 4.2 MEQ/L (ref 3.4–4.9)
PROT SERPL-MCNC: 6.9 G/DL (ref 6.3–8)
RBC # BLD AUTO: 4.97 M/UL (ref 4.7–6.1)
SODIUM SERPL-SCNC: 140 MEQ/L (ref 135–144)
TRIGLYCERIDE, FASTING: 291 MG/DL (ref 0–150)
WBC # BLD AUTO: 6.4 K/UL (ref 4.8–10.8)

## 2023-06-08 PROCEDURE — 99215 OFFICE O/P EST HI 40 MIN: CPT | Performed by: PHYSICIAN ASSISTANT

## 2023-06-08 RX ORDER — OMEPRAZOLE 40 MG/1
CAPSULE, DELAYED RELEASE ORAL
Qty: 30 CAPSULE | Refills: 5 | Status: SHIPPED | OUTPATIENT
Start: 2023-06-08

## 2023-06-08 RX ORDER — MELOXICAM 7.5 MG/1
7.5 TABLET ORAL DAILY
Qty: 30 TABLET | Refills: 5 | Status: SHIPPED | OUTPATIENT
Start: 2023-06-08

## 2023-06-08 RX ORDER — IRON HEME POLYPEPTIDE/FOLIC AC 12-1MG
5000 TABLET ORAL DAILY
Qty: 90 CAPSULE | Refills: 1 | Status: SHIPPED | OUTPATIENT
Start: 2023-06-08

## 2023-06-08 RX ORDER — DICYCLOMINE HCL 20 MG
20 TABLET ORAL 4 TIMES DAILY
Qty: 120 TABLET | Refills: 2 | Status: CANCELLED | OUTPATIENT
Start: 2023-06-08

## 2023-06-08 RX ORDER — ERGOCALCIFEROL 1.25 MG/1
CAPSULE ORAL
Qty: 4 CAPSULE | Refills: 5 | Status: CANCELLED | OUTPATIENT
Start: 2023-06-08

## 2023-06-08 SDOH — ECONOMIC STABILITY: HOUSING INSECURITY
IN THE LAST 12 MONTHS, WAS THERE A TIME WHEN YOU DID NOT HAVE A STEADY PLACE TO SLEEP OR SLEPT IN A SHELTER (INCLUDING NOW)?: NO

## 2023-06-08 SDOH — ECONOMIC STABILITY: FOOD INSECURITY: WITHIN THE PAST 12 MONTHS, YOU WORRIED THAT YOUR FOOD WOULD RUN OUT BEFORE YOU GOT MONEY TO BUY MORE.: NEVER TRUE

## 2023-06-08 SDOH — ECONOMIC STABILITY: FOOD INSECURITY: WITHIN THE PAST 12 MONTHS, THE FOOD YOU BOUGHT JUST DIDN'T LAST AND YOU DIDN'T HAVE MONEY TO GET MORE.: NEVER TRUE

## 2023-06-08 SDOH — ECONOMIC STABILITY: INCOME INSECURITY: HOW HARD IS IT FOR YOU TO PAY FOR THE VERY BASICS LIKE FOOD, HOUSING, MEDICAL CARE, AND HEATING?: NOT HARD AT ALL

## 2023-06-08 ASSESSMENT — PATIENT HEALTH QUESTIONNAIRE - PHQ9
SUM OF ALL RESPONSES TO PHQ QUESTIONS 1-9: 0
SUM OF ALL RESPONSES TO PHQ QUESTIONS 1-9: 0
2. FEELING DOWN, DEPRESSED OR HOPELESS: 0
SUM OF ALL RESPONSES TO PHQ QUESTIONS 1-9: 0
1. LITTLE INTEREST OR PLEASURE IN DOING THINGS: 0
SUM OF ALL RESPONSES TO PHQ QUESTIONS 1-9: 0
SUM OF ALL RESPONSES TO PHQ9 QUESTIONS 1 & 2: 0

## 2023-06-08 NOTE — PROGRESS NOTES
swelling. Gastrointestinal:  Positive for abdominal distention (mild cramping/bloating) and diarrhea (loose stools). Negative for anal bleeding, blood in stool, constipation, nausea, rectal pain and vomiting. Genitourinary:  Negative for difficulty urinating. Musculoskeletal:  Positive for arthralgias, myalgias and neck stiffness. Negative for back pain, gait problem, joint swelling and neck pain. Skin:  Negative for color change and rash. Neurological:  Positive for numbness and headaches. Negative for dizziness, tremors, seizures, syncope, speech difficulty, weakness and light-headedness. Hematological:  Does not bruise/bleed easily. Psychiatric/Behavioral:  Negative for agitation, dysphoric mood and sleep disturbance. The patient is not nervous/anxious. No past medical history on file. Past Surgical History:   Procedure Laterality Date    COLONOSCOPY  3/27/15    w/bx      Social History     Socioeconomic History    Marital status: Single     Spouse name: Not on file    Number of children: Not on file    Years of education: Not on file    Highest education level: Not on file   Occupational History    Not on file   Tobacco Use    Smoking status: Former     Packs/day: 0.25     Years: 4.00     Pack years: 1.00     Types: Cigarettes     Quit date: 2019     Years since quittin.2    Smokeless tobacco: Never   Substance and Sexual Activity    Alcohol use:  Yes     Alcohol/week: 14.0 standard drinks     Types: 14 Cans of beer per week    Drug use: No    Sexual activity: Yes     Partners: Female   Other Topics Concern    Not on file   Social History Narrative    Not on file     Social Determinants of Health     Financial Resource Strain: Low Risk     Difficulty of Paying Living Expenses: Not hard at all   Food Insecurity: No Food Insecurity    Worried About Running Out of Food in the Last Year: Never true    [address] in the Last Year: Never true   Transportation Needs: Unknown

## 2023-06-09 LAB — VITAMIN D 25-HYDROXY: 27.6 NG/ML

## 2023-06-11 PROBLEM — R51.9 RECURRENT HEADACHE: Status: ACTIVE | Noted: 2023-06-11

## 2023-06-11 PROBLEM — R94.131 ABNORMAL EMG: Status: ACTIVE | Noted: 2023-06-11

## 2023-06-11 PROBLEM — Z86.69 HISTORY OF HYDROCEPHALUS AS A CHILD: Status: ACTIVE | Noted: 2023-06-11

## 2023-06-11 ASSESSMENT — ENCOUNTER SYMPTOMS
SHORTNESS OF BREATH: 0
BLOOD IN STOOL: 0
WHEEZING: 0
COLOR CHANGE: 0
DIARRHEA: 1
COUGH: 0
ANAL BLEEDING: 0
FACIAL SWELLING: 0
ABDOMINAL DISTENTION: 1
TROUBLE SWALLOWING: 0
CHEST TIGHTNESS: 0
VOMITING: 0
BACK PAIN: 0
CONSTIPATION: 0
RECTAL PAIN: 0
NAUSEA: 0